# Patient Record
(demographics unavailable — no encounter records)

---

## 2024-12-14 NOTE — PHYSICAL EXAM
[No Lymphadenopathy] : no lymphadenopathy [Supple] : supple [Normal] : normal rate, regular rhythm, normal S1 and S2 and no murmur heard [No Edema] : there was no peripheral edema [No Focal Deficits] : no focal deficits [Normal Affect] : the affect was normal [Normal Mood] : the mood was normal [Normal Insight/Judgement] : insight and judgment were intact [de-identified] : lymphadenopathy in neck [de-identified] : no calf tenderness b/l LE

## 2024-12-14 NOTE — PHYSICAL EXAM
[No Lymphadenopathy] : no lymphadenopathy [Supple] : supple [Normal] : normal rate, regular rhythm, normal S1 and S2 and no murmur heard [No Edema] : there was no peripheral edema [No Focal Deficits] : no focal deficits [Normal Affect] : the affect was normal [Normal Mood] : the mood was normal [Normal Insight/Judgement] : insight and judgment were intact [de-identified] : lymphadenopathy in neck [de-identified] : no calf tenderness b/l LE

## 2024-12-14 NOTE — HISTORY OF PRESENT ILLNESS
[FreeTextEntry1] : Patient presents as new patient to establish care and also has had Strep about 5 or 6 times this year but feels good now, patient is not having any symptoms. [de-identified] : Patient presents as new patient to establish care and also has had Strep about 5 or 6 times this year but feels good now, patient is not having any symptoms.  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

## 2024-12-14 NOTE — PLAN
[FreeTextEntry1] :  frequent infections, baseline fatigue  advised to see immunologist check celiac abs  lymphadenoathy in neck us neck  flu vaccine given left arm IM no adverse reactions noted, consent obtained  advised if patient doesnt hear from me 1 week after testing to call office for results , patient agreed w/plan and understood. f/u for cpe pt agreed w/plan

## 2024-12-14 NOTE — HISTORY OF PRESENT ILLNESS
[FreeTextEntry1] : Patient presents as new patient to establish care and also has had Strep about 5 or 6 times this year but feels good now, patient is not having any symptoms. [de-identified] : Patient presents as new patient to establish care and also has had Strep about 5 or 6 times this year but feels good now, patient is not having any symptoms.  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

## 2025-01-11 NOTE — PHYSICAL EXAM
[No Lymphadenopathy] : no lymphadenopathy [Supple] : supple [No Edema] : there was no peripheral edema [Normal Appearance] : normal in appearance [No Nipple Discharge] : no nipple discharge [No Axillary Lymphadenopathy] : no axillary lymphadenopathy [Normal] : soft, non-tender, non-distended, no masses palpated, no HSM and normal bowel sounds [Normal Posterior Cervical Nodes] : no posterior cervical lymphadenopathy [Normal Anterior Cervical Nodes] : no anterior cervical lymphadenopathy [No CVA Tenderness] : no CVA  tenderness [No Rash] : no rash [No Focal Deficits] : no focal deficits [Normal Affect] : the affect was normal [Normal Mood] : the mood was normal [Normal Insight/Judgement] : insight and judgment were intact [de-identified] : anterior cervical lymphadenopathy  [de-identified] : no calf tenderness b/l LE [de-identified] : dense left breast at 12 oclock [de-identified] :  no guarding or rigidity [de-identified] : CN 2-12 INTACT, NORMAL STRENGTH UPPER AND LOWER EXT B/L 5/5, NORMAL RAPID ALTERNATING MOVEMENTS AND FINGER TO NOSE

## 2025-01-11 NOTE — HISTORY OF PRESENT ILLNESS
[FreeTextEntry1] : patient presents for physical  [de-identified] : patient presents for physical  she is feeling well overall she wakes up sweating at times about 3-4 x per week and she has to change her clothes denies using heavy blankets or having the heat high headaches and brain fog at times x years intermittent headaches denies waking from sleep c/o   frontal headache  pain,   4-5 /10 intermittent  , alleviated by nothing  , aggravated by  light

## 2025-01-11 NOTE — REVIEW OF SYSTEMS
[Night Sweats] : night sweats [Recent Change In Weight] : ~T recent weight change [Headache] : headache [Anxiety] : anxiety [Easy Bruising] : easy bruising [Negative] : ENT [Fever] : no fever [Chills] : no chills [Chest Pain] : no chest pain [Palpitations] : no palpitations [Shortness Of Breath] : no shortness of breath [Abdominal Pain] : no abdominal pain [Nausea] : no nausea [Constipation] : no constipation [Diarrhea] : diarrhea [Vomiting] : no vomiting [Melena] : no melena [Dysuria] : no dysuria [Hematuria] : no hematuria [Frequency] : no frequency [Itching] : no itching [Mole Changes] : no mole changes [Skin Rash] : no skin rash [Dizziness] : no dizziness [Fainting] : no fainting [Easy Bleeding] : no easy bleeding [Swollen Glands] : no swollen glands [FreeTextEntry2] : 5lb weight gain [de-identified] : headaches and brain fog at times x years, she has vision changes at times where she has a lagging of vision denies headaches waking her from sleep  [de-identified] : panic attacks at times  [de-identified] : =easy bruising

## 2025-01-11 NOTE — PHYSICAL EXAM
[No Lymphadenopathy] : no lymphadenopathy [Supple] : supple [No Edema] : there was no peripheral edema [Normal Appearance] : normal in appearance [No Nipple Discharge] : no nipple discharge [No Axillary Lymphadenopathy] : no axillary lymphadenopathy [Normal] : soft, non-tender, non-distended, no masses palpated, no HSM and normal bowel sounds [Normal Posterior Cervical Nodes] : no posterior cervical lymphadenopathy [Normal Anterior Cervical Nodes] : no anterior cervical lymphadenopathy [No CVA Tenderness] : no CVA  tenderness [No Rash] : no rash [No Focal Deficits] : no focal deficits [Normal Affect] : the affect was normal [Normal Mood] : the mood was normal [Normal Insight/Judgement] : insight and judgment were intact [de-identified] : anterior cervical lymphadenopathy  [de-identified] : no calf tenderness b/l LE [de-identified] : dense left breast at 12 oclock [de-identified] :  no guarding or rigidity [de-identified] : CN 2-12 INTACT, NORMAL STRENGTH UPPER AND LOWER EXT B/L 5/5, NORMAL RAPID ALTERNATING MOVEMENTS AND FINGER TO NOSE

## 2025-01-11 NOTE — HISTORY OF PRESENT ILLNESS
[FreeTextEntry1] : patient presents for physical  [de-identified] : patient presents for physical  she is feeling well overall she wakes up sweating at times about 3-4 x per week and she has to change her clothes denies using heavy blankets or having the heat high headaches and brain fog at times x years intermittent headaches denies waking from sleep c/o   frontal headache  pain,   4-5 /10 intermittent  , alleviated by nothing  , aggravated by  light

## 2025-01-11 NOTE — REVIEW OF SYSTEMS
[Night Sweats] : night sweats [Recent Change In Weight] : ~T recent weight change [Headache] : headache [Anxiety] : anxiety [Easy Bruising] : easy bruising [Negative] : ENT [Fever] : no fever [Chills] : no chills [Chest Pain] : no chest pain [Palpitations] : no palpitations [Shortness Of Breath] : no shortness of breath [Abdominal Pain] : no abdominal pain [Nausea] : no nausea [Constipation] : no constipation [Diarrhea] : diarrhea [Vomiting] : no vomiting [Melena] : no melena [Dysuria] : no dysuria [Hematuria] : no hematuria [Frequency] : no frequency [Itching] : no itching [Mole Changes] : no mole changes [Skin Rash] : no skin rash [Dizziness] : no dizziness [Fainting] : no fainting [Easy Bleeding] : no easy bleeding [Swollen Glands] : no swollen glands [FreeTextEntry2] : 5lb weight gain [de-identified] : headaches and brain fog at times x years, she has vision changes at times where she has a lagging of vision denies headaches waking her from sleep  [de-identified] : panic attacks at times  [de-identified] : =easy bruising

## 2025-01-11 NOTE — PLAN
[FreeTextEntry1] : CPE -Labs -Advised annual ophthalmology, dental and dermatology visits -Advised monthly breast exams -Annual depression screening - negative    headaches and brain fog at times x years mri brain /mra not done advised to do so  neuro consult advised   easy bruising pt/inr ptt  anterior cervical lymphadenopathy  us neck cbc  nightsweats if doesnt resolve w/ using lighter blankets advised to see heme/onc   dense left breast at 12 oclock us left breast   advised if patient doesnt hear from me 1 week after testing to call office for results , patient agreed w/plan and understood.

## 2025-02-10 NOTE — DISCUSSION/SUMMARY
[FreeTextEntry1] : 31 yo here for well woman exam, hx frequent cycles and dysmenorrhea.  1) Health maintenance: -Pap + HPV -GC/CT -STI testing, referral given -s/p Gardasil  2) Dysmenorrhea, frequent cycles, PMS: -Pelvic ultrasound for further evaluation -Day 3 labs -Pt desires OCP for contraception. Discussed other benefits of menstrual regulation, improvement in dysmenorrhea and PMS symptoms. There are no contraindications, Rx Rica sent, instructed on use. Discussed back up contraception for first 2 weeks.  Safe sexual practices reviewed  RTO 2 mo
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
## 2025-02-10 NOTE — PHYSICAL EXAM
[Chaperone Present] : A chaperone was present in the examining room during all aspects of the physical examination [[id number]] : A chaperone was present during the pelvic exam. [Appropriately responsive] : appropriately responsive [Alert] : alert [No Acute Distress] : no acute distress [No Lymphadenopathy] : no lymphadenopathy [Soft] : soft [Non-tender] : non-tender [Non-distended] : non-distended [No HSM] : No HSM [No Lesions] : no lesions [No Mass] : no mass [Oriented x3] : oriented x3 [Examination Of The Breasts] : a normal appearance [No Masses] : no breast masses were palpable [Labia Majora] : normal [Labia Minora] : normal [Normal] : normal [Uterine Adnexae] : normal [FreeTextEntry2] : AMANDEEP Haile

## 2025-02-10 NOTE — PHYSICAL EXAM
[Chaperone Present] : A chaperone was present in the examining room during all aspects of the physical examination [[id number]] : A chaperone was present during the pelvic exam. [Appropriately responsive] : appropriately responsive [Alert] : alert [No Acute Distress] : no acute distress [No Lymphadenopathy] : no lymphadenopathy [Soft] : soft [Non-tender] : non-tender [Non-distended] : non-distended [No HSM] : No HSM [No Lesions] : no lesions [No Mass] : no mass [Oriented x3] : oriented x3 [Examination Of The Breasts] : a normal appearance [No Masses] : no breast masses were palpable [Labia Majora] : normal [Labia Minora] : normal [Normal] : normal [Uterine Adnexae] : normal [FreeTextEntry2] : AMANDEEP Haile

## 2025-06-20 NOTE — HISTORY OF PRESENT ILLNESS
[FreeTextEntry1] : 29 yo with Dysmenorrhea, frequent cycles, PMS: presents in office for sonogram and bloodwork follow up.  06/10/2025 Bloodwork  TSH: 1.04 CMP: GLUC 122 CBC- WBC: 7.7, HTC: 37.2, HGB: 12.2, PLAT: 293